# Patient Record
Sex: FEMALE | Race: WHITE | URBAN - METROPOLITAN AREA
[De-identification: names, ages, dates, MRNs, and addresses within clinical notes are randomized per-mention and may not be internally consistent; named-entity substitution may affect disease eponyms.]

---

## 2017-01-01 ENCOUNTER — OFFICE VISIT (OUTPATIENT)
Dept: SURGERY | Facility: CLINIC | Age: 82
End: 2017-01-01
Payer: COMMERCIAL

## 2017-01-01 ENCOUNTER — TRANSFERRED RECORDS (OUTPATIENT)
Dept: HEALTH INFORMATION MANAGEMENT | Facility: CLINIC | Age: 82
End: 2017-01-01

## 2017-01-01 VITALS
HEART RATE: 74 BPM | DIASTOLIC BLOOD PRESSURE: 74 MMHG | BODY MASS INDEX: 24.44 KG/M2 | HEIGHT: 69 IN | WEIGHT: 165 LBS | SYSTOLIC BLOOD PRESSURE: 119 MMHG

## 2017-01-01 DIAGNOSIS — Z17.1 MALIGNANT NEOPLASM OF UPPER-OUTER QUADRANT OF LEFT BREAST IN FEMALE, ESTROGEN RECEPTOR NEGATIVE (H): Primary | ICD-10-CM

## 2017-01-01 DIAGNOSIS — C50.412 MALIGNANT NEOPLASM OF UPPER-OUTER QUADRANT OF LEFT BREAST IN FEMALE, ESTROGEN RECEPTOR NEGATIVE (H): Primary | ICD-10-CM

## 2017-01-01 PROCEDURE — 99205 OFFICE O/P NEW HI 60 MIN: CPT | Performed by: SURGERY

## 2017-01-01 RX ORDER — LEVOTHYROXINE SODIUM 75 UG/1
75 TABLET ORAL DAILY
COMMUNITY

## 2017-07-27 NOTE — NURSING NOTE
Breast Patients    BREAST PATIENTS (ALL)    1-Do you have any of the following symptoms? Lump(s) or Mass(es)  2-In which breast are you having the symptoms? left  3-Do you use hormones?  No  4-Have you had a Mammogram? Yes  Where: [facility]  Date: 7/12/2017  5-Have you ever had a breast cyst drained? No  6-Have you ever had a breast biopsy? Yes  Side: Left  Date: 7/14/2017  7-Have you ever had a Breast Cancer? No   8-Is there a history of Breast Cancer in your family? No  9-Have you ever had Ovarian Cancer? No  10-Is there a history of Ovarian Cancer in your family? No  11-Summarize your caffeine intake (i.e. coffee, tea, chocolate, soda etc.): 2 cups daily    BREAST PATIENTS (FEMALE)    12-What age did your periods begin? 14  13-Date your last menstrual period began? 5/1980  14-Number of full-term pregnancies: 2  15-Your age when your first child was born? 26  16-Did you nurse your children? No  17-Are you pregnant now? No  18-Have you begun menopause? Yes  Age Menopause began:  early 40s  19-Have you had either ovary removed?Yes  Date of Surgery:  early 40's  20-Do you have breast implants? No

## 2017-07-27 NOTE — MR AVS SNAPSHOT
"              After Visit Summary   2017    Amy Lu    MRN: [id number]           Patient Information     Date Of Birth          1935        Visit Information        Provider Department      2017 2:10 PM Ame Schwarz MD [facility]       Follow-ups after your visit        Who to contact     If you have questions or need follow up information about today's clinic visit or your schedule please contact [facility] directly at [phone].  Normal or non-critical lab and imaging results will be communicated to you by [employer], letter or phone within 4 business days after the clinic has received the results. If you do not hear from us within 7 days, please contact the clinic through [employer] or phone. If you have a critical or abnormal lab result, we will notify you by phone as soon as possible.  Submit refill requests through [employer] or call your pharmacy and they will forward the refill request to us. Please allow 3 business days for your refill to be completed.          Additional Information About Your Visit        MyChart Information     [employer] lets you send messages to your doctor, view your test results, renew your prescriptions, schedule appointments and more. To sign up, go to [email or web address] . Click on \"Log in\" on the left side of the screen, which will take you to the Welcome page. Then click on \"Sign up Now\" on the right side of the page.     You will be asked to enter the access code listed below, as well as some personal information. Please follow the directions to create your username and password.     Your access code is: [email or web address]  Expires: 10/25/2017  4:17 PM     Your access code will  in 90 days. If you need help or a new code, please call your [facility] clinic or [phone].        Care EveryWhere ID     This is your Care EveryWhere ID. This could be " "used by other organizations to access your [facility] medical records  [id number]        Your Vitals Were     Pulse Height BMI (Body Mass Index)             74 5' 9\" (1.753 m) 24.37 kg/m2          Blood Pressure from Last 3 Encounters:   07/27/17 119/74   08/31/12 126/64    Weight from Last 3 Encounters:   07/27/17 165 lb (74.8 kg)   08/29/12 170 lb (77.1 kg)              Today, you had the following     No orders found for display       Primary Care Provider Office Phone # Fax #    Jenna Machado [phone]       [facility] [address]  [place] [address]        Equal Access to Services     [facility] : Hadii poppy castillo hadasho Sodon, waaxda luqadaha, qaybta kaalmada adeegyada, keila han . So [facility] [phone].    ATENCIÓN: Si habla español, tiene a allen disposición servicios gratuitos de asistencia lingüística. [facility] [phone].    We comply with applicable federal civil rights laws and [place] laws. We do not discriminate on the basis of race, color, national origin, age, disability sex, sexual orientation or gender identity.            Thank you!     Thank you for choosing [facility]  for your care. Our goal is always to provide you with excellent care. Hearing back from our patients is one way we can continue to improve our services. Please take a few minutes to complete the written survey that you may receive in the mail after your visit with us. Thank you!             Your Updated Medication List - Protect others around you: Learn how to safely use, store and throw away your medicines at www.disposemymeds.org.          This list is accurate as of: 7/27/17  4:17 PM.  Always use your most recent med list.                   Brand Name Dispense Instructions for use Diagnosis    levothyroxine 75 MCG tablet    SYNTHROID/LEVOTHROID     Take 75 mcg by mouth daily          "

## 2017-07-27 NOTE — LETTER
2017    [facility] Surgery Consultation    RE:  Amy Lu-:  [birth year]     HPI:   Amy Lu is a 82 year old female who is seen in consultation at the request of Dr. Machado for evaluation of newly diagnosed left breast cancer. She is here for a 2nd opinion. She has previously seen a breast surgeon at [facility] as well as Oncology at [facility], Dr. Hunter. She reports she felt a lump in her left breast and then had diagnostic mammogram followed by ultrasound and core needle biopsy of both the breast as well as a left axillary lymph node. She reports she is otherwise healthy. She has had multiple orthopedic surgeries in the past. She is only taking synthroid. She has felt well overall. She denies pain in either breast. She typically does not perform self breast exams.     Breast mass noted:  left   Skin rashes, dimpling or nipple changes:  none  Nipple discharge:  none  Perform self breast exams: No  Previous breast biopsies: No  Previous cyst aspiration: No  Previous other breast surgery: No     Hormonal history:  Post menopausal, hysterectomy at age 46, no OCP use, 21 years of HRT, no fertility treatment.  2 children, 1st at age 26, she did not breast feed. Menarche at age 14.      Family history of breast cancer: No  Family history of ovarian cancer:  No  Family history of colon cancer: No  Family history of prostate cancer: No     Imaging:      Imaging performed at [facility], I have reviewed all imaging and reports which are available in PACS. There is a large left breast mass on mammogram and ultrasound as well as a large visible node on both studies.      Percutaneous core needle biopsy, left: left breast invasive ductal carcinoma, grade 3, no dcis present. ER/ME -, HER 2 equivocal by IHC, negative by FISH.      Past Medical History:  Has a past medical history of Headaches and History of blood transfusion.       ROS:  The 10 point review of systems is negative other than noted in  "the HPI and above.     PE:  Vitals: /74  Pulse 74  Ht 5' 9\" (1.753 m)  Wt 165 lb (74.8 kg)  BMI 24.37 kg/m2  General appearance: well-nourished, sitting comfortably, no apparent distress  Psych: normal affect, pleasant  HEENT:  Head normocephalic and atraumatic, pupils equal and round, conjunctivae clear, mucous membranes moist, external ears and nose normal  Neck: Supple without thyromegaly or masses  Lungs: Respirations unlabored  Lymphatic: No cervical, or supraclavicular lymphadenopathy  Extremities: Without edema  Musculoskeletal:  Normal station and gait  Neurologic: nonfocal, grossly intact times four extremities, alert and oriented times three  Psychiatric: Mood and affect are appropriate  Skin: Without lesions or rashes     Breast:  A bilateral breast exam was performed in the supine position.. Bilateral breasts were palpated in a circumferential clockwise fashion including the supraclavicular and axillary areas.   Right breast: Symmetrical with no skin or nipple changes. Contour is normal. Parenchyma is soft.  Left breast: palpable mass, measures approximately 6cm on exam in the left upper outer quadrant, overlying skin appears normal. Nipple is normal. Mild ecchymosis from biopsy site.      Lymph:                                             No supraclavicular/infraclavicular adenopathy.                        Axillary adenopathy: left - single large easily palpable node in left axilla.      Assessment:  Left breast invasive ductal carcinoma, grade 3, ER -, [place] -, [id number] - measuring 6 cm at 2:00 and 4 cm from the nipple.     Plan:  Ms. Lu is a very pleasant 83yo female who has newly diagnosed left breast cancer.  I reviewed the imaging and pathology reports with her and friend and explained the findings.  We talked about the fact that this is invasive ductal carcinoma  that is large in size and has aggressive features on pathology as it is a triple negative cancer and grade 3 with disease in " the left axilla as well. I asked if she discussed possible PET CT with Dr. Hunter with Oncology and she reports she had and that she did not think it was needed.      I discussed possible options with Ms. Lu including chemotherapy first to try to decrease the size of the mass and potentially make her a candidate for a lumpectomy and SLNB pending response to chemotherapy vs surgery first which would be a mastectomy with axillary lymph node dissection. Mastectomy with SLNB after chemotherapy is of course an option as well, again pending response to chemo.      We further discussed the surgical options for treatment.  I described the procedures for lumpectomy and mastectomy including the details of the procedures, the risks, anesthesia and expected recovery.  Given the current size of the mass I do not think a lumpectomy would be a good option as I would have to remove nearly 50% of the breast and she would still likely need radiation. She seems against radiation at this point but is considering chemotherapy first.      We discussed the rational for axillary node dissection given the positive william disease and the risks associated including lymphedema. We discussed that even with mastectomy, radiation may be recommended pending final pathology and number of positive axillary lymph nodes.        We also talked about post-mastectomy reconstruction and the stages involved. She is not interested in plastic surgery.      We have discussed the indication, alternatives, risks and expected recovery.  Specifically, we have discussed local recurrence of cancer, risk of future second primary breast cancer, incisions, scarring, breast deformity, volume loss, possible need for axillary lymphadenectomy, postoperative infection, anesthesia, bleeding, blood transfusion, DVT, PE, postoperative restrictions and physical limitations.  We have discussed the recommended interventions and treatments for these outcomes.    All questions  have been answered to the best of my ability.     She will call our clinic with her decision regarding ongoing therapy.           Ame Schwarz MD

## 2017-07-28 NOTE — PROGRESS NOTES
[facility] Surgery Consultation    HPI:   Amy Lu is a 82 year old female who is seen in consultation at the request of Dr. Machado for evaluation of newly diagnosed left breast cancer. She is here for a 2nd opinion. She has previously seen a breast surgeon at [facility] as well as Oncology at [facility], Dr. Hunter. She reports she felt a lump in her left breast and then had diagnostic mammogram followed by ultrasound and core needle biopsy of both the breast as well as a left axillary lymph node. She reports she is otherwise healthy. She has had multiple orthopedic surgeries in the past. She is only taking synthroid. She has felt well overall. She denies pain in either breast. She typically does not perform self breast exams.    Breast mass noted:  left   Skin rashes, dimpling or nipple changes:  none  Nipple discharge:  none  Perform self breast exams: No  Previous breast biopsies: No  Previous cyst aspiration: No  Previous other breast surgery: No    Hormonal history:  Post menopausal, hysterectomy at age 46, no OCP use, 21 years of HRT, no fertility treatment.  2 children, 1st at age 26, she did not breast feed. Menarche at age 14.     Family history of breast cancer: No  Family history of ovarian cancer:  No  Family history of colon cancer: No  Family history of prostate cancer: No    Imaging:     Imaging performed at [facility], I have reviewed all imaging and reports which are available in PACS. There is a large left breast mass on mammogram and ultrasound as well as a large visible node on both studies.     Percutaneous core needle biopsy, left: left breast invasive ductal carcinoma, grade 3, no dcis present. ER/[place] -, HER 2 equivocal by IHC, negative by FISH.       Past Medical History:   has a past medical history of Headaches and History of blood transfusion.    Past Surgical History:  Past Surgical History:   Procedure Laterality Date     ARTHROPLASTY KNEE BILATERAL       ARTHROSCOPY KNEE    "   left     ARTHROSCOPY SHOULDER RT/LT      left     C TOTAL HIP ARTHROPLASTY      Left & right SHIV with revision in 2004     CATARACT IOL, RT/LT       CHOLECYSTECTOMY       HC LIGATION OR BIOPSY TEMPORAL ARTERY  8/31/2012    Procedure: BIOPSY ARTERY TEMPORAL;  Surgeon: Ayden Cartwright MD;  Location: [facility]        Social History:  Social History     Social History     Marital status:      Spouse name: N/A     Number of children: N/A     Years of education: N/A     Occupational History     Not on file.     Social History Main Topics     Smoking status: Never Smoker     Smokeless tobacco: Never Used     Alcohol use Yes     Drug use: No     Sexual activity: Not on file     Other Topics Concern     Not on file     Social History Narrative        ROS:  The 10 point review of systems is negative other than noted in the HPI and above.    PE:  Vitals: /74  Pulse 74  Ht 5' 9\" (1.753 m)  Wt 165 lb (74.8 kg)  BMI 24.37 kg/m2  General appearance: well-nourished, sitting comfortably, no apparent distress  Psych: normal affect, pleasant  HEENT:  Head normocephalic and atraumatic, pupils equal and round, conjunctivae clear, mucous membranes moist, external ears and nose normal  Neck: Supple without thyromegaly or masses  Lungs: Respirations unlabored  Lymphatic: No cervical, or supraclavicular lymphadenopathy  Extremities: Without edema  Musculoskeletal:  Normal station and gait  Neurologic: nonfocal, grossly intact times four extremities, alert and oriented times three  Psychiatric: Mood and affect are appropriate  Skin: Without lesions or rashes    Breast:  A bilateral breast exam was performed in the supine position.. Bilateral breasts were palpated in a circumferential clockwise fashion including the supraclavicular and axillary areas.   Right breast: Symmetrical with no skin or nipple changes. Contour is normal. Parenchyma is soft.  Left breast: palpable mass, measures approximately 6cm on exam in the " left upper outer quadrant, overlying skin appears normal. Nipple is normal. Mild ecchymosis from biopsy site.     Lymph:       No supraclavicular/infraclavicular adenopathy.   Axillary adenopathy: left - single large easily palpable node in left axilla.     Assessment:  Left breast invasive ductal carcinoma, grade 3, ER -, [place] -, [id number] - measuring 6 cm at 2:00 and 4 cm from the nipple.    Plan:  Ms. Lu is a very pleasant 81yo female who has newly diagnosed left breast cancer.  I reviewed the imaging and pathology reports with her and friend and explained the findings.  We talked about the fact that this is invasive ductal carcinoma  that is large in size and has aggressive features on pathology as it is a triple negative cancer and grade 3 with disease in the left axilla as well. I asked if she discussed possible PET CT with Dr. Hunter with Oncology and she reports she had and that she did not think it was needed.     I discussed possible options with Ms. Lu including chemotherapy first to try to decrease the size of the mass and potentially make her a candidate for a lumpectomy and SLNB pending response to chemotherapy vs surgery first which would be a mastectomy with axillary lymph node dissection. Mastectomy with SLNB after chemotherapy is of course an option as well, again pending response to chemo.     We further discussed the surgical options for treatment.  I described the procedures for lumpectomy and mastectomy including the details of the procedures, the risks, anesthesia and expected recovery.  Given the current size of the mass I do not think a lumpectomy would be a good option as I would have to remove nearly 50% of the breast and she would still likely need radiation. She seems against radiation at this point but is considering chemotherapy first.     We discussed the rational for axillary node dissection given the positive william disease and the risks associated including lymphedema. We  discussed that even with mastectomy, radiation may be recommended pending final pathology and number of positive axillary lymph nodes.       We also talked about post-mastectomy reconstruction and the stages involved. She is not interested in plastic surgery.     We have discussed the indication, alternatives, risks and expected recovery.  Specifically, we have discussed local recurrence of cancer, risk of future second primary breast cancer, incisions, scarring, breast deformity, volume loss, possible need for axillary lymphadenectomy, postoperative infection, anesthesia, bleeding, blood transfusion, DVT, PE, postoperative restrictions and physical limitations.  We have discussed the recommended interventions and treatments for these outcomes.    All questions have been answered to the best of my ability.    She will call our clinic with her decision regarding ongoing therapy.     Time spent with the patient with greater that 50% of the time in discussion was 70 minutes.     Ame Schwarz MD      Please route or send letter to:  Primary Care Provider (PCP) and Referring Provider